# Patient Record
Sex: MALE | Race: BLACK OR AFRICAN AMERICAN | Employment: FULL TIME
[De-identification: names, ages, dates, MRNs, and addresses within clinical notes are randomized per-mention and may not be internally consistent; named-entity substitution may affect disease eponyms.]

---

## 2024-10-29 ENCOUNTER — APPOINTMENT (OUTPATIENT)
Facility: HOSPITAL | Age: 39
End: 2024-10-29
Payer: OTHER GOVERNMENT

## 2024-10-29 ENCOUNTER — HOSPITAL ENCOUNTER (EMERGENCY)
Facility: HOSPITAL | Age: 39
Discharge: HOME | End: 2024-10-29
Attending: EMERGENCY MEDICINE
Payer: OTHER GOVERNMENT

## 2024-10-29 DIAGNOSIS — S43.004A DISLOCATION OF RIGHT SHOULDER JOINT, INITIAL ENCOUNTER: Primary | ICD-10-CM

## 2024-10-29 PROCEDURE — 99153 MOD SED SAME PHYS/QHP EA: CPT

## 2024-10-29 PROCEDURE — 73030 X-RAY EXAM OF SHOULDER: CPT

## 2024-10-29 PROCEDURE — 99152 MOD SED SAME PHYS/QHP 5/>YRS: CPT

## 2024-10-29 PROCEDURE — 2500000003 HC RX 250 WO HCPCS: Performed by: EMERGENCY MEDICINE

## 2024-10-29 PROCEDURE — 6360000002 HC RX W HCPCS: Performed by: EMERGENCY MEDICINE

## 2024-10-29 PROCEDURE — 96374 THER/PROPH/DIAG INJ IV PUSH: CPT

## 2024-10-29 PROCEDURE — 23650 CLTX SHO DSLC W/MNPJ WO ANES: CPT

## 2024-10-29 PROCEDURE — 99285 EMERGENCY DEPT VISIT HI MDM: CPT

## 2024-10-29 RX ORDER — KETOROLAC TROMETHAMINE 15 MG/ML
30 INJECTION, SOLUTION INTRAMUSCULAR; INTRAVENOUS
Status: COMPLETED | OUTPATIENT
Start: 2024-10-29 | End: 2024-10-29

## 2024-10-29 RX ORDER — KETAMINE HYDROCHLORIDE 50 MG/ML
1 INJECTION, SOLUTION INTRAMUSCULAR; INTRAVENOUS
Status: COMPLETED | OUTPATIENT
Start: 2024-10-29 | End: 2024-10-29

## 2024-10-29 RX ADMIN — KETAMINE HYDROCHLORIDE 75 MG: 50 INJECTION INTRAMUSCULAR; INTRAVENOUS at 20:35

## 2024-10-29 RX ADMIN — KETOROLAC TROMETHAMINE 30 MG: 15 INJECTION, SOLUTION INTRAMUSCULAR; INTRAVENOUS at 19:44

## 2024-10-29 ASSESSMENT — PAIN DESCRIPTION - DESCRIPTORS
DESCRIPTORS: ACHING

## 2024-10-29 ASSESSMENT — ENCOUNTER SYMPTOMS
RESPIRATORY NEGATIVE: 1
BACK PAIN: 0

## 2024-10-29 ASSESSMENT — PAIN SCALES - GENERAL
PAINLEVEL_OUTOF10: 4
PAINLEVEL_OUTOF10: 5
PAINLEVEL_OUTOF10: 10
PAINLEVEL_OUTOF10: 5

## 2024-10-29 ASSESSMENT — PAIN DESCRIPTION - PAIN TYPE
TYPE: ACUTE PAIN

## 2024-10-29 ASSESSMENT — PAIN DESCRIPTION - LOCATION
LOCATION: SHOULDER

## 2024-10-29 ASSESSMENT — PAIN DESCRIPTION - ORIENTATION
ORIENTATION: RIGHT

## 2024-10-29 ASSESSMENT — PAIN DESCRIPTION - ONSET
ONSET: ON-GOING

## 2024-10-29 ASSESSMENT — PAIN DESCRIPTION - FREQUENCY
FREQUENCY: CONTINUOUS

## 2024-10-29 ASSESSMENT — PAIN - FUNCTIONAL ASSESSMENT
PAIN_FUNCTIONAL_ASSESSMENT: ACTIVITIES ARE NOT PREVENTED

## 2024-10-29 NOTE — ED NOTES
Patient wheeled from triage with c/o 10/10 to right shoulder. Hx of shoulder dislocation. EDT placing patient on the cardiac monitor. 20g PIV established to left AC.

## 2024-10-30 VITALS
RESPIRATION RATE: 16 BRPM | DIASTOLIC BLOOD PRESSURE: 82 MMHG | TEMPERATURE: 98.1 F | HEART RATE: 69 BPM | WEIGHT: 170 LBS | OXYGEN SATURATION: 99 % | SYSTOLIC BLOOD PRESSURE: 133 MMHG | BODY MASS INDEX: 21.14 KG/M2 | HEIGHT: 75 IN

## 2024-10-30 NOTE — ED NOTES
Discharge instructions reviewed with patient and spouse. Verbalized understanding. PIV removed. Patient ambulatory with steady gait to ED lobby in no acute distress. Patient's spouse confirmed that she is transporting patient home.

## 2024-10-30 NOTE — ED NOTES
Provider, resident, RT and EDT present at the bedside for conscious sedation/ closed right shoulder reduction. Consent reviewed and completed with patient. NC placed on patient for ETCO2 monitoring.

## 2024-10-30 NOTE — DISCHARGE INSTRUCTIONS
Come back if you get worse.  Follow-up without fail.  Please use sling until you follow-up with orthopedic doctor.  Use Motrin 800 or 600 mg every 4-6 hours as needed for pain.

## 2024-10-30 NOTE — ED PROVIDER NOTES
[facility] EMERGENCY DEPT  EMERGENCY DEPARTMENT ENCOUNTER      Pt Name: Rodrigo Jerez  MRN: [id number]  Birthdate 1985  Date of evaluation: 10/29/2024  Provider: VI GHOSH MD  9:15 PM    CHIEF COMPLAINT       Chief Complaint   Patient presents with    Shoulder Injury         HISTORY OF PRESENT ILLNESS    Rodrigo Jerez is a 39 y.o. male who presents to the emergency department     39-year-old male states he has no medical issues except chronic pain presents the emergency department right shoulder dislocation.  Is very painful and he was boxing before issue happened.  Patient has no head injury.  Pain is worse with movement better with rest.    The history is provided by the patient. No  was used.       Nursing Notes were reviewed.    REVIEW OF SYSTEMS       Review of Systems   Respiratory: Negative.     Cardiovascular: Negative.  Negative for chest pain, palpitations and leg swelling.   Musculoskeletal:  Negative for back pain, gait problem, joint swelling, myalgias, neck pain and neck stiffness.       Except as noted above the remainder of the review of systems was reviewed and negative.       PAST MEDICAL HISTORY   No past medical history on file.      SURGICAL HISTORY     No past surgical history on file.      CURRENT MEDICATIONS       Previous Medications    No medications on file       ALLERGIES     Patient has no known allergies.    FAMILY HISTORY     No family history on file.       SOCIAL HISTORY       Social History     Socioeconomic History    Marital status:        SCREENINGS         Ana Coma Scale  Eye Opening: Spontaneous  Best Verbal Response: Oriented  Best Motor Response: Obeys commands  [place] Coma Scale Score: 15                     CIWA Assessment  BP: (!) 166/98  Pulse: 77                 PHYSICAL EXAM       ED Triage Vitals [10/29/24 1927]   BP Systolic BP Percentile Diastolic BP Percentile Temp Temp Source Pulse Respirations SpO2   (!) 130/92 -- -- 98.1 °F (36.7

## 2024-10-30 NOTE — ED NOTES
Procedure complete. Patient tolerated fair. Right arm placed in sling. NAD. Reports 5/10 pain to right shoulder